# Patient Record
Sex: FEMALE | URBAN - NONMETROPOLITAN AREA
[De-identification: names, ages, dates, MRNs, and addresses within clinical notes are randomized per-mention and may not be internally consistent; named-entity substitution may affect disease eponyms.]

---

## 2023-01-30 ENCOUNTER — APPOINTMENT (RX ONLY)
Dept: URBAN - NONMETROPOLITAN AREA CLINIC 11 | Facility: CLINIC | Age: 73
Setting detail: DERMATOLOGY
End: 2023-01-30

## 2023-01-30 DIAGNOSIS — M71 OTHER BURSOPATHIES: ICD-10-CM

## 2023-01-30 PROBLEM — M71.342 OTHER BURSAL CYST, LEFT HAND: Status: ACTIVE | Noted: 2023-01-30

## 2023-01-30 PROCEDURE — ? ADDITIONAL NOTES

## 2023-01-30 PROCEDURE — ? COUNSELING

## 2023-01-30 PROCEDURE — 99202 OFFICE O/P NEW SF 15 MIN: CPT

## 2023-01-30 ASSESSMENT — LOCATION DETAILED DESCRIPTION DERM
LOCATION DETAILED: LEFT DISTAL RADIAL DORSAL INDEX FINGER
LOCATION DETAILED: LEFT INDEX FINGER DISTAL INTERPHALANGEAL JOINT

## 2023-01-30 ASSESSMENT — LOCATION SIMPLE DESCRIPTION DERM: LOCATION SIMPLE: LEFT INDEX FINGER

## 2023-01-30 ASSESSMENT — LOCATION ZONE DERM: LOCATION ZONE: FINGER

## 2023-01-30 NOTE — PROCEDURE: ADDITIONAL NOTES
Additional Notes: Discussed treatment options: Hand surgeon vs Hot compressed. \\n\\n\\nAdvised pt best to be removed by hand surgeon given these cyst are so close to the joint. Benign no treatment is required unless bothersome. Warm/ hot compress to the area can help the area calm down. \\n\\nPer pt will continue with the warm / hot compresses.
Detail Level: Simple
Render Risk Assessment In Note?: no